# Patient Record
Sex: FEMALE | Race: BLACK OR AFRICAN AMERICAN
[De-identification: names, ages, dates, MRNs, and addresses within clinical notes are randomized per-mention and may not be internally consistent; named-entity substitution may affect disease eponyms.]

---

## 2017-11-21 ENCOUNTER — HOSPITAL ENCOUNTER (EMERGENCY)
Dept: HOSPITAL 17 - NEPD | Age: 22
Discharge: HOME | End: 2017-11-21
Payer: SELF-PAY

## 2017-11-21 VITALS
HEART RATE: 84 BPM | OXYGEN SATURATION: 99 % | DIASTOLIC BLOOD PRESSURE: 90 MMHG | RESPIRATION RATE: 18 BRPM | TEMPERATURE: 97.8 F | SYSTOLIC BLOOD PRESSURE: 162 MMHG

## 2017-11-21 DIAGNOSIS — R09.81: ICD-10-CM

## 2017-11-21 DIAGNOSIS — Z88.6: ICD-10-CM

## 2017-11-21 DIAGNOSIS — J02.9: Primary | ICD-10-CM

## 2017-11-21 DIAGNOSIS — R05: ICD-10-CM

## 2017-11-21 PROCEDURE — 84703 CHORIONIC GONADOTROPIN ASSAY: CPT

## 2017-11-21 PROCEDURE — 99282 EMERGENCY DEPT VISIT SF MDM: CPT

## 2017-11-21 NOTE — PD
HPI


Chief Complaint:  Cold / Flu Symptoms


Time Seen by Provider:  05:55


Travel History


International Travel<30 days:  No


Contact w/Intl Traveler<30days:  No


Traveled to known affect area:  No





History of Present Illness


HPI


22-year-old  female presents to emergency department with a 2 day 

history of fever, sore throat, congestion and slight cough.  Positive malaise.  

She denies any nausea vomiting.  No abdominal pain or diarrhea.  No dysuria or 

frequency.  Last period was the 16th of last month.  She does not smoke or 

drink.  Symptoms are mild-moderate.  No alleviating symptoms.  Exacerbated by 

cough.





[facility]


Past Medical History


Medical History:  Denies Significant Hx


Pregnant?:  Unknown


LMP:  10/17/17





Past Surgical History


Surgical History:  No Previous Surgery





Social History


Alcohol Use:  No


Tobacco Use:  No


Substance Use:  No





Allergies-Medications


(Allergen,Severity, Reaction):  


Coded Allergies:  


     ibuprofen (Verified  Adverse Reaction, Intermediate, hives, 11/21/17)


Reported Meds & Prescriptions





Reported Meds & Active Scripts


Active


No Active Prescriptions or Reported Medications    








Review of Systems


Except as stated in HPI:  all other systems reviewed are Neg





Physical Exam


Narrative


GENERAL:  Well-developed, well-nourished in no acute distress. Nontoxic 

appearing.


HEAD: Normocephalic, atraumatic.


EYES: Pupils equal round and reactive. Extraocular motions intact. No scleral 

icterus. No injection or drainage. 


ENT: TMs clear without erythema.  The external auditory canals clear.  Nose: 

clear .  Posterior pharynx is erythematous and moist.  Mild tonsillar edema but 

no  exudate.  Uvula midline. Airway patent.


NECK: Trachea midline.Supple, nontender, moves head freely.  No central bony 

tenderness or spasm.


CARDIOVASCULAR: Regular rate and rhythm without murmurs, gallops, or rubs. 


RESPIRATORY: Clear to auscultation. Breath sounds equal bilaterally. No wheezes

, rales, or rhonchi.  


GASTROINTESTINAL: Abdomen soft, non-tender, nondistended. No hepato-splenomegaly

, or palpable masses. No guarding.


EXTREMITIES: No clubbing, cyanosis, or edema. No joint tenderness, effusion, or 

edema noted. 


BACK: Nontender without deformity or crepitance. No flank tenderness.





Data


Data


Last Documented VS





Vital Signs








  Date Time  Temp Pulse Resp B/P (MAP) Pulse Ox O2 Delivery O2 Flow Rate FiO2


 


11/21/17 05:47 97.8 84 18 162/90 (114) 99   








Orders





 Orders


Ed Urine Pregnancytest Poc (11/21/17 06:02)








MDM


Medical Decision Making


Medical Screen Exam Complete:  Yes


Emergency Medical Condition:  Yes


Medical Record Reviewed:  Yes


Interpretation(s)


HCG: Negative


Differential Diagnosis


MDM: High


Differential diagnoses: Strep throat, viral pharyngitis, mono, URI


Narrative Course


Patient's hCG is negative





This is pharyngitis





Diagnosis





 Primary Impression:  


 Acute pharyngitis


 Qualified Codes:  J02.9 - Acute pharyngitis, unspecified


Patient Instructions:  General Instructions





***Additional Instructions:  


Rest.


Force fluids.


Saltwater gargles.


Tylenol.


Chloraseptic Spray Cepastat lozenge.


Amoxicillin.


Follow-up with a primary care doctor in one week.


Return to the ER if any problems.


***Med/Other Pt SpecificInfo:  Prescription(s) given


Scripts


No Active Prescriptions or Reported Meds


Disposition:  01 DISCHARGE HOME


Condition:  Stable











Lm Reinoso Nov 21, 2017 06:06

## 2018-02-22 ENCOUNTER — HOSPITAL ENCOUNTER (EMERGENCY)
Dept: HOSPITAL 17 - NEPK | Age: 23
Discharge: HOME | End: 2018-02-22
Payer: SELF-PAY

## 2018-02-22 VITALS — BODY MASS INDEX: 44.44 KG/M2 | HEIGHT: 59 IN | WEIGHT: 220.46 LBS

## 2018-02-22 VITALS
RESPIRATION RATE: 16 BRPM | DIASTOLIC BLOOD PRESSURE: 97 MMHG | TEMPERATURE: 98.4 F | OXYGEN SATURATION: 98 % | SYSTOLIC BLOOD PRESSURE: 204 MMHG | HEART RATE: 110 BPM

## 2018-02-22 VITALS
OXYGEN SATURATION: 99 % | DIASTOLIC BLOOD PRESSURE: 86 MMHG | SYSTOLIC BLOOD PRESSURE: 130 MMHG | HEART RATE: 70 BPM | RESPIRATION RATE: 18 BRPM

## 2018-02-22 DIAGNOSIS — L03.116: Primary | ICD-10-CM

## 2018-02-22 PROCEDURE — 99283 EMERGENCY DEPT VISIT LOW MDM: CPT

## 2018-02-22 NOTE — PD
HPI


Chief Complaint:  Skin Problem


Time Seen by Provider:  13:15


Travel History


International Travel<30 days:  No


Contact w/Intl Traveler<30days:  No


Traveled to known affect area:  No





History of Present Illness


HPI


22-year-old female presents to the emergency department for evaluation of a 

possible spider bite to her left thigh.  Patient states she first noticed it 3 

days ago.  It is more painful with walking.  Current pain is 8/10 without 

radiation.  No fevers or chills.  She has no chronic medical problems and takes 

no prescribed medications.  She denies any chance of pregnancy.  No 

exacerbating or alleviating factors.  Moderate severity.





PFSH


Past Medical History


Pregnant?:  Not Pregnant


LMP:  01/17/18





Social History


Alcohol Use:  No


Tobacco Use:  No


Substance Use:  No





Allergies-Medications


(Allergen,Severity, Reaction):  


Coded Allergies:  


     ibuprofen (Verified  Adverse Reaction, Intermediate, hives, 11/21/17)


Reported Meds & Prescriptions





Reported Meds & Active Scripts


Active


Amoxicillin 500 Mg Tab 500 Mg PO TID 10 Days








Review of Systems


Except as stated in HPI:  all other systems reviewed are Neg





Physical Exam


Narrative


GENERAL: Well-nourished, well-developed female patient, afebrile.


SKIN: Focused skin assessment warm/dry.  Patient is approximately 5 cm x 5 cm 

area of erythema to the left lateral thigh.  There is a small amount of 

induration, but no fluctuance or evidence of abscess formation.  No active 

drainage.


HEAD: Normocephalic.  Atraumatic.


EYES: No scleral icterus. No injection or drainage. 


NECK: Supple, trachea midline. No JVD or lymphadenopathy.


CARDIOVASCULAR: Regular rate and rhythm without murmurs, gallops, or rubs. 


RESPIRATORY: Breath sounds equal bilaterally. No accessory muscle use.  Lungs 

sounds are clear to auscultation.


MUSCULOSKELETAL: No cyanosis, or edema.





Data


Data


Last Documented VS





Vital Signs








  Date Time  Temp Pulse Resp B/P (MAP) Pulse Ox O2 Delivery O2 Flow Rate FiO2


 


2/22/18 12:33 98.4 110 16 204/97 (132) 98   











MDM


Medical Decision Making


Medical Screen Exam Complete:  Yes


Emergency Medical Condition:  Yes


Medical Record Reviewed:  Yes


Differential Diagnosis


Cellulitis versus abscess versus cyst


Narrative Course


22-year-old female presents to the emergency department for evaluation of skin 

infection to her left lateral thigh.  No evidence of abscess formation on exam.

  She is instructed to do warm, moist compresses and if it comes to head to 

return for I&D.  Patient will be started on Bactrim and Keflex for cellulitis.  

Patient is allergic ibuprofen.  She'll be discharged short-term prescription 

for tramadol for pain.  She is instructed to follow-up with a primary care 

physician or return here for any acute worsening of symptoms.





Patient was noted to be hypertensive and tachycardic in triage.  A rechecked 

these vitals myself and her blood pressure is 130/86 with a heart rate of 70.





The patient was discharged in stable condition with instructions, including 

return instructions and follow up instructions.





Diagnosis





 Primary Impression:  


 Cellulitis


 Qualified Codes:  L03.116 - Cellulitis of left lower limb


Referrals:  


Primary Care Physician


call for appointment


Patient Instructions:  Cellulitis (ED), General Instructions


Departure Forms:  Tests/Procedures, Work Release   Enter return to work date:  

Feb 24, 2018





***Additional Instructions:  


Take antibiotics as directed until gone.


Clean twice daily with soap and water.  Apply over-the-counter antibiotic 

ointment.


Warm, moist compresses 4-5 times daily.


Take tramadol as directed as needed for pain.


Follow-up with your primary care physician.


Return to the emergency department for any acute worsening of symptoms.


***Med/Other Pt SpecificInfo:  Prescription(s) given


Scripts


Tramadol (Tramadol) 50 Mg Tab


50 MG PO Q8H Y for PAIN, #9 TAB 0 Refills


   Prov: Briana Shook         2/22/18 


Cephalexin (Keflex) 500 Mg Capsule


500 MG PO Q6H for Infection for 10 Days, #40 CAP 0 Refills


   Prov: Briana Shook         2/22/18 


Sulfamethoxazole-Trimethoprim (Bactrim DS) 800-160 Mg Tab


1 TAB PO BID for Infection, #20 TAB 0 Refills


   Prov: Briana Shook         2/22/18


Disposition:  01 DISCHARGE HOME


Condition:  Stable











Briana Shook Feb 22, 2018 13:33